# Patient Record
Sex: FEMALE | URBAN - METROPOLITAN AREA
[De-identification: names, ages, dates, MRNs, and addresses within clinical notes are randomized per-mention and may not be internally consistent; named-entity substitution may affect disease eponyms.]

---

## 2017-10-09 ENCOUNTER — INPATIENT (INPATIENT)
Facility: HOSPITAL | Age: 77
LOS: 1 days | Discharge: HOME | End: 2017-10-11
Attending: INTERNAL MEDICINE | Admitting: INTERNAL MEDICINE
Payer: COMMERCIAL

## 2017-10-09 VITALS
RESPIRATION RATE: 17 BRPM | HEART RATE: 69 BPM | WEIGHT: 119.93 LBS | OXYGEN SATURATION: 97 % | HEIGHT: 61 IN | DIASTOLIC BLOOD PRESSURE: 73 MMHG | TEMPERATURE: 98 F | SYSTOLIC BLOOD PRESSURE: 145 MMHG

## 2017-10-09 LAB
ALBUMIN SERPL ELPH-MCNC: 3.3 G/DL — SIGNIFICANT CHANGE UP (ref 3.3–5)
ALP SERPL-CCNC: 85 U/L — SIGNIFICANT CHANGE UP (ref 40–120)
ALT FLD-CCNC: 26 U/L — SIGNIFICANT CHANGE UP (ref 12–78)
ANION GAP SERPL CALC-SCNC: 11 MMOL/L — SIGNIFICANT CHANGE UP (ref 5–17)
APTT BLD: 25.3 SEC — LOW (ref 27.5–37.4)
AST SERPL-CCNC: 26 U/L — SIGNIFICANT CHANGE UP (ref 15–37)
BASOPHILS # BLD AUTO: 0.1 K/UL — SIGNIFICANT CHANGE UP (ref 0–0.2)
BASOPHILS NFR BLD AUTO: 0.4 % — SIGNIFICANT CHANGE UP (ref 0–2)
BILIRUB SERPL-MCNC: 0.5 MG/DL — SIGNIFICANT CHANGE UP (ref 0.2–1.2)
BUN SERPL-MCNC: 15 MG/DL — SIGNIFICANT CHANGE UP (ref 7–23)
CALCIUM SERPL-MCNC: 7.8 MG/DL — LOW (ref 8.5–10.1)
CHLORIDE SERPL-SCNC: 100 MMOL/L — SIGNIFICANT CHANGE UP (ref 96–108)
CK MB BLD-MCNC: 1.3 % — SIGNIFICANT CHANGE UP (ref 0–3.5)
CK MB CFR SERPL CALC: 1.8 NG/ML — SIGNIFICANT CHANGE UP (ref 0.5–3.6)
CK SERPL-CCNC: 136 U/L — SIGNIFICANT CHANGE UP (ref 26–192)
CO2 SERPL-SCNC: 23 MMOL/L — SIGNIFICANT CHANGE UP (ref 22–31)
CREAT SERPL-MCNC: 0.82 MG/DL — SIGNIFICANT CHANGE UP (ref 0.5–1.3)
EOSINOPHIL # BLD AUTO: 0.1 K/UL — SIGNIFICANT CHANGE UP (ref 0–0.5)
EOSINOPHIL NFR BLD AUTO: 0.6 % — SIGNIFICANT CHANGE UP (ref 0–6)
GLUCOSE SERPL-MCNC: 115 MG/DL — HIGH (ref 70–99)
HCT VFR BLD CALC: 37.6 % — SIGNIFICANT CHANGE UP (ref 34.5–45)
HGB BLD-MCNC: 12.6 G/DL — SIGNIFICANT CHANGE UP (ref 11.5–15.5)
INR BLD: 0.95 RATIO — SIGNIFICANT CHANGE UP (ref 0.88–1.16)
LYMPHOCYTES # BLD AUTO: 1.4 K/UL — SIGNIFICANT CHANGE UP (ref 1–3.3)
LYMPHOCYTES # BLD AUTO: 8.7 % — LOW (ref 13–44)
MCHC RBC-ENTMCNC: 30.6 PG — SIGNIFICANT CHANGE UP (ref 27–34)
MCHC RBC-ENTMCNC: 33.6 GM/DL — SIGNIFICANT CHANGE UP (ref 32–36)
MCV RBC AUTO: 91.1 FL — SIGNIFICANT CHANGE UP (ref 80–100)
MONOCYTES # BLD AUTO: 1 K/UL — HIGH (ref 0–0.9)
MONOCYTES NFR BLD AUTO: 5.9 % — SIGNIFICANT CHANGE UP (ref 2–14)
NEUTROPHILS # BLD AUTO: 14.1 K/UL — HIGH (ref 1.8–7.4)
NEUTROPHILS NFR BLD AUTO: 84.4 % — HIGH (ref 43–77)
PLATELET # BLD AUTO: 228 K/UL — SIGNIFICANT CHANGE UP (ref 150–400)
POTASSIUM SERPL-MCNC: 4.1 MMOL/L — SIGNIFICANT CHANGE UP (ref 3.5–5.3)
POTASSIUM SERPL-SCNC: 4.1 MMOL/L — SIGNIFICANT CHANGE UP (ref 3.5–5.3)
PROT SERPL-MCNC: 6.8 GM/DL — SIGNIFICANT CHANGE UP (ref 6–8.3)
PROTHROM AB SERPL-ACNC: 10.3 SEC — SIGNIFICANT CHANGE UP (ref 9.8–12.7)
RBC # BLD: 4.12 M/UL — SIGNIFICANT CHANGE UP (ref 3.8–5.2)
RBC # FLD: 12.1 % — SIGNIFICANT CHANGE UP (ref 11–15)
SODIUM SERPL-SCNC: 134 MMOL/L — LOW (ref 135–145)
TROPONIN I SERPL-MCNC: <.015 NG/ML — SIGNIFICANT CHANGE UP (ref 0.01–0.04)
WBC # BLD: 16.7 K/UL — HIGH (ref 3.8–10.5)
WBC # FLD AUTO: 16.7 K/UL — HIGH (ref 3.8–10.5)

## 2017-10-09 PROCEDURE — 71010: CPT | Mod: 26

## 2017-10-09 PROCEDURE — 99223 1ST HOSP IP/OBS HIGH 75: CPT

## 2017-10-09 PROCEDURE — 70450 CT HEAD/BRAIN W/O DYE: CPT | Mod: 26

## 2017-10-09 PROCEDURE — 99285 EMERGENCY DEPT VISIT HI MDM: CPT

## 2017-10-09 RX ORDER — SODIUM CHLORIDE 9 MG/ML
3 INJECTION INTRAMUSCULAR; INTRAVENOUS; SUBCUTANEOUS ONCE
Qty: 0 | Refills: 0 | Status: COMPLETED | OUTPATIENT
Start: 2017-10-09 | End: 2017-10-09

## 2017-10-09 RX ORDER — ONDANSETRON 8 MG/1
0 TABLET, FILM COATED ORAL
Qty: 0 | Refills: 0 | COMMUNITY

## 2017-10-09 RX ORDER — AMLODIPINE BESYLATE 2.5 MG/1
1 TABLET ORAL
Qty: 0 | Refills: 0 | COMMUNITY

## 2017-10-09 RX ORDER — SIMVASTATIN 20 MG/1
1 TABLET, FILM COATED ORAL
Qty: 0 | Refills: 0 | COMMUNITY

## 2017-10-09 RX ORDER — FAMOTIDINE 10 MG/ML
20 INJECTION INTRAVENOUS ONCE
Qty: 0 | Refills: 0 | Status: COMPLETED | OUTPATIENT
Start: 2017-10-09 | End: 2017-10-09

## 2017-10-09 RX ORDER — SODIUM CHLORIDE 9 MG/ML
500 INJECTION INTRAMUSCULAR; INTRAVENOUS; SUBCUTANEOUS ONCE
Qty: 0 | Refills: 0 | Status: COMPLETED | OUTPATIENT
Start: 2017-10-09 | End: 2017-10-09

## 2017-10-09 RX ADMIN — Medication 30 MILLILITER(S): at 19:59

## 2017-10-09 RX ADMIN — SODIUM CHLORIDE 500 MILLILITER(S): 9 INJECTION INTRAMUSCULAR; INTRAVENOUS; SUBCUTANEOUS at 20:08

## 2017-10-09 RX ADMIN — SODIUM CHLORIDE 3 MILLILITER(S): 9 INJECTION INTRAMUSCULAR; INTRAVENOUS; SUBCUTANEOUS at 20:09

## 2017-10-09 RX ADMIN — FAMOTIDINE 20 MILLIGRAM(S): 10 INJECTION INTRAVENOUS at 20:08

## 2017-10-09 NOTE — ED PROVIDER NOTE - MEDICAL DECISION MAKING DETAILS
Pt well appearing and asymptomatic. wcc likely from stress. pending UA. CT scan demonstrates no acute pathology. EKG wnl. CE neg. d/w dr. reaves for admission.

## 2017-10-09 NOTE — ED PROVIDER NOTE - OBJECTIVE STATEMENT
75yo female with pmh HTN, HL, GERD, recent dx of invasive breast CA, s/p rt lumpectomy today, dc at 430. Pt has been fasting since yesterday. Pt felt lightheaded and a little unstable gait since dc. Pt felt thirsty and has some food pta and felt rt breast pain and took 600mg of motrin while sitting. Then family noted pt was diaphoretic, slumped backwards and not responsive with slurred speech. Pt was confused for ~ 10 min until arrival by EMS.  When pt awoke, vomit x 1. Pt now c/o of SS burning pain similar to her GERD after she vomited and denies dizziness.     ROS: No fever/chills. No photophobia/eye pain/changes in vision, No ear pain/sore throat/dysphagia, No chest pain/palpitations. No SOB/cough/stridor. No abdominal pain, N/V/D, no black/bloody bm. No dysuria/frequency/discharge, No headache. + Dizziness.  No rash.  No numbness/tingling/weakness.

## 2017-10-09 NOTE — ED ADULT NURSE NOTE - OBJECTIVE STATEMENT
pt brought in by family with c/o pt brought in by family with c/o episode of unresponsive for 10 minutes , became responsive when the EMS arrived, pt is now axox4 Mongolian speaking with little English, not in any distress, pt is s/p lumpectomy done today with pain at the site, vss, will monitor.

## 2017-10-09 NOTE — ED PROVIDER NOTE - PHYSICAL EXAMINATION
Gen: Alert, Well appearing. NAD    Head: NC, AT, PERRL, EOMI, normal lids/conjunctiva   ENT: Bilateral TM WNL, normal hearing, patent oropharynx without erythema/exudate, uvula midline  Neck: supple, no tenderness/meningismus/JVD   Pulm: Bilateral clear BS, normal resp effort, no wheeze/stridor/retractions  CV: RRR, no M/R/G, +dist pulses   Abd: soft, NT/ND, +BS, no guarding/rebound tenderness  Mskel: no edema/erythema/cyanosis   Skin: rt breast lumpectomy: no significant erythema, swelling, well healed  Neuro: AAOx3, no sensory/motor deficits, CN 2-12 intact

## 2017-10-09 NOTE — ED ADULT TRIAGE NOTE - CHIEF COMPLAINT QUOTE
episode of unresponsiveness and diaphoretic/ pale   patient had lumpectomy to right breast this am took Motrin went to eat / vomited and then had unresponsiveness  patient alert in triage

## 2017-10-10 DIAGNOSIS — K21.9 GASTRO-ESOPHAGEAL REFLUX DISEASE WITHOUT ESOPHAGITIS: ICD-10-CM

## 2017-10-10 DIAGNOSIS — R55 SYNCOPE AND COLLAPSE: ICD-10-CM

## 2017-10-10 DIAGNOSIS — I10 ESSENTIAL (PRIMARY) HYPERTENSION: ICD-10-CM

## 2017-10-10 LAB
ANION GAP SERPL CALC-SCNC: 9 MMOL/L — SIGNIFICANT CHANGE UP (ref 5–17)
APPEARANCE UR: CLEAR — SIGNIFICANT CHANGE UP
BASOPHILS # BLD AUTO: 0.1 K/UL — SIGNIFICANT CHANGE UP (ref 0–0.2)
BASOPHILS NFR BLD AUTO: 0.4 % — SIGNIFICANT CHANGE UP (ref 0–2)
BILIRUB UR-MCNC: NEGATIVE — SIGNIFICANT CHANGE UP
BUN SERPL-MCNC: 13 MG/DL — SIGNIFICANT CHANGE UP (ref 7–23)
CALCIUM SERPL-MCNC: 8 MG/DL — LOW (ref 8.5–10.1)
CHLORIDE SERPL-SCNC: 111 MMOL/L — HIGH (ref 96–108)
CHOLEST SERPL-MCNC: 183 MG/DL — SIGNIFICANT CHANGE UP (ref 10–199)
CO2 SERPL-SCNC: 23 MMOL/L — SIGNIFICANT CHANGE UP (ref 22–31)
COLOR SPEC: YELLOW — SIGNIFICANT CHANGE UP
COMMENT - URINE: SIGNIFICANT CHANGE UP
CREAT SERPL-MCNC: 0.85 MG/DL — SIGNIFICANT CHANGE UP (ref 0.5–1.3)
DIFF PNL FLD: NEGATIVE — SIGNIFICANT CHANGE UP
EOSINOPHIL # BLD AUTO: 0.1 K/UL — SIGNIFICANT CHANGE UP (ref 0–0.5)
EOSINOPHIL NFR BLD AUTO: 0.8 % — SIGNIFICANT CHANGE UP (ref 0–6)
GLUCOSE SERPL-MCNC: 87 MG/DL — SIGNIFICANT CHANGE UP (ref 70–99)
GLUCOSE UR QL: NEGATIVE MG/DL — SIGNIFICANT CHANGE UP
HBA1C BLD-MCNC: 5.5 % — SIGNIFICANT CHANGE UP (ref 4–5.6)
HCT VFR BLD CALC: 39.2 % — SIGNIFICANT CHANGE UP (ref 34.5–45)
HDLC SERPL-MCNC: 57 MG/DL — SIGNIFICANT CHANGE UP (ref 40–125)
HGB BLD-MCNC: 12.8 G/DL — SIGNIFICANT CHANGE UP (ref 11.5–15.5)
KETONES UR-MCNC: NEGATIVE — SIGNIFICANT CHANGE UP
LEUKOCYTE ESTERASE UR-ACNC: ABNORMAL
LIPID PNL WITH DIRECT LDL SERPL: 97 MG/DL — SIGNIFICANT CHANGE UP
LYMPHOCYTES # BLD AUTO: 1.3 K/UL — SIGNIFICANT CHANGE UP (ref 1–3.3)
LYMPHOCYTES # BLD AUTO: 9.9 % — LOW (ref 13–44)
MCHC RBC-ENTMCNC: 30.4 PG — SIGNIFICANT CHANGE UP (ref 27–34)
MCHC RBC-ENTMCNC: 32.6 GM/DL — SIGNIFICANT CHANGE UP (ref 32–36)
MCV RBC AUTO: 93.2 FL — SIGNIFICANT CHANGE UP (ref 80–100)
MONOCYTES # BLD AUTO: 0.7 K/UL — SIGNIFICANT CHANGE UP (ref 0–0.9)
MONOCYTES NFR BLD AUTO: 4.9 % — SIGNIFICANT CHANGE UP (ref 2–14)
NEUTROPHILS # BLD AUTO: 11.4 K/UL — HIGH (ref 1.8–7.4)
NEUTROPHILS NFR BLD AUTO: 84 % — HIGH (ref 43–77)
NITRITE UR-MCNC: NEGATIVE — SIGNIFICANT CHANGE UP
PH UR: 7 — SIGNIFICANT CHANGE UP (ref 5–8)
PLATELET # BLD AUTO: 209 K/UL — SIGNIFICANT CHANGE UP (ref 150–400)
POTASSIUM SERPL-MCNC: 4.1 MMOL/L — SIGNIFICANT CHANGE UP (ref 3.5–5.3)
POTASSIUM SERPL-SCNC: 4.1 MMOL/L — SIGNIFICANT CHANGE UP (ref 3.5–5.3)
PROT UR-MCNC: NEGATIVE MG/DL — SIGNIFICANT CHANGE UP
RBC # BLD: 4.2 M/UL — SIGNIFICANT CHANGE UP (ref 3.8–5.2)
RBC # FLD: 11.8 % — SIGNIFICANT CHANGE UP (ref 11–15)
SODIUM SERPL-SCNC: 143 MMOL/L — SIGNIFICANT CHANGE UP (ref 135–145)
SP GR SPEC: 1 — LOW (ref 1.01–1.02)
TOTAL CHOLESTEROL/HDL RATIO MEASUREMENT: 3.2 RATIO — LOW (ref 3.3–7.1)
TRIGL SERPL-MCNC: 145 MG/DL — SIGNIFICANT CHANGE UP (ref 10–149)
TROPONIN I SERPL-MCNC: <.015 NG/ML — SIGNIFICANT CHANGE UP (ref 0.01–0.04)
UROBILINOGEN FLD QL: NEGATIVE MG/DL — SIGNIFICANT CHANGE UP
WBC # BLD: 13.6 K/UL — HIGH (ref 3.8–10.5)
WBC # FLD AUTO: 13.6 K/UL — HIGH (ref 3.8–10.5)
WBC UR QL: SIGNIFICANT CHANGE UP

## 2017-10-10 PROCEDURE — 93010 ELECTROCARDIOGRAM REPORT: CPT

## 2017-10-10 PROCEDURE — 99232 SBSQ HOSP IP/OBS MODERATE 35: CPT

## 2017-10-10 PROCEDURE — 99223 1ST HOSP IP/OBS HIGH 75: CPT

## 2017-10-10 RX ORDER — ASPIRIN/CALCIUM CARB/MAGNESIUM 324 MG
81 TABLET ORAL DAILY
Qty: 0 | Refills: 0 | Status: DISCONTINUED | OUTPATIENT
Start: 2017-10-10 | End: 2017-10-11

## 2017-10-10 RX ORDER — SIMVASTATIN 20 MG/1
20 TABLET, FILM COATED ORAL AT BEDTIME
Qty: 0 | Refills: 0 | Status: DISCONTINUED | OUTPATIENT
Start: 2017-10-10 | End: 2017-10-11

## 2017-10-10 RX ORDER — PANTOPRAZOLE SODIUM 20 MG/1
40 TABLET, DELAYED RELEASE ORAL DAILY
Qty: 0 | Refills: 0 | Status: DISCONTINUED | OUTPATIENT
Start: 2017-10-10 | End: 2017-10-11

## 2017-10-10 RX ORDER — AMLODIPINE BESYLATE 2.5 MG/1
2.5 TABLET ORAL DAILY
Qty: 0 | Refills: 0 | Status: DISCONTINUED | OUTPATIENT
Start: 2017-10-10 | End: 2017-10-11

## 2017-10-10 RX ORDER — SODIUM CHLORIDE 9 MG/ML
1000 INJECTION INTRAMUSCULAR; INTRAVENOUS; SUBCUTANEOUS ONCE
Qty: 0 | Refills: 0 | Status: COMPLETED | OUTPATIENT
Start: 2017-10-10 | End: 2017-10-10

## 2017-10-10 RX ORDER — ONDANSETRON 8 MG/1
4 TABLET, FILM COATED ORAL EVERY 6 HOURS
Qty: 0 | Refills: 0 | Status: DISCONTINUED | OUTPATIENT
Start: 2017-10-10 | End: 2017-10-11

## 2017-10-10 RX ORDER — HEPARIN SODIUM 5000 [USP'U]/ML
5000 INJECTION INTRAVENOUS; SUBCUTANEOUS EVERY 12 HOURS
Qty: 0 | Refills: 0 | Status: DISCONTINUED | OUTPATIENT
Start: 2017-10-10 | End: 2017-10-11

## 2017-10-10 RX ORDER — ACETAMINOPHEN 500 MG
650 TABLET ORAL EVERY 6 HOURS
Qty: 0 | Refills: 0 | Status: DISCONTINUED | OUTPATIENT
Start: 2017-10-10 | End: 2017-10-11

## 2017-10-10 RX ORDER — CIPROFLOXACIN LACTATE 400MG/40ML
250 VIAL (ML) INTRAVENOUS
Qty: 0 | Refills: 0 | Status: DISCONTINUED | OUTPATIENT
Start: 2017-10-10 | End: 2017-10-11

## 2017-10-10 RX ADMIN — HEPARIN SODIUM 5000 UNIT(S): 5000 INJECTION INTRAVENOUS; SUBCUTANEOUS at 17:12

## 2017-10-10 RX ADMIN — HEPARIN SODIUM 5000 UNIT(S): 5000 INJECTION INTRAVENOUS; SUBCUTANEOUS at 06:05

## 2017-10-10 RX ADMIN — PANTOPRAZOLE SODIUM 40 MILLIGRAM(S): 20 TABLET, DELAYED RELEASE ORAL at 11:41

## 2017-10-10 RX ADMIN — Medication 81 MILLIGRAM(S): at 11:42

## 2017-10-10 RX ADMIN — SIMVASTATIN 20 MILLIGRAM(S): 20 TABLET, FILM COATED ORAL at 21:26

## 2017-10-10 RX ADMIN — SODIUM CHLORIDE 1500 MILLILITER(S): 9 INJECTION INTRAMUSCULAR; INTRAVENOUS; SUBCUTANEOUS at 03:12

## 2017-10-10 RX ADMIN — Medication 650 MILLIGRAM(S): at 04:20

## 2017-10-10 RX ADMIN — Medication 650 MILLIGRAM(S): at 03:49

## 2017-10-10 RX ADMIN — AMLODIPINE BESYLATE 2.5 MILLIGRAM(S): 2.5 TABLET ORAL at 06:05

## 2017-10-10 RX ADMIN — Medication 250 MILLIGRAM(S): at 17:12

## 2017-10-10 NOTE — H&P ADULT - PMH
Essential hypertension    Gastroesophageal reflux disease, esophagitis presence not specified    Syncope, unspecified syncope type

## 2017-10-10 NOTE — CONSULT NOTE ADULT - SUBJECTIVE AND OBJECTIVE BOX
CARDIOLOGY CONSULT NOTE    Patient is a 76y Female with a known history of :  Gastroesophageal reflux disease, esophagitis presence not specified (K21.9)  Essential hypertension (I10)  Syncope, unspecified syncope type (R55)    HPI:  75 y/o female with pmh HTN, HL, GERD, recent dx of invasive breast CA, s/p rt lumpectomy yesterday. Pt had been fasting for the procedure and upon discharge, felt lightheaded and a little unstable gait.  At home, she felt thirsty and had some food; also c/p rt breast pain at the incision site. As per family, she subsequently became very profusely diaphoretic, slumped backwards and became responsive with slurred speech. Pt was confused for ~ 10 min.  When pt awoke, vomited x 1. When seen by ED physician, c/o SS burning pain similar to her GERD after she vomited and denied dizziness. Pt treated with antacid and H2 blocker and symptoms resolved. Pt has history of previous syncopal episodes, per son, this was never worked up. No FH syncope. No fever/chills. No photophobia/eye pain/changes in vision, No ear pain/sore throat/dysphagia, No current chest pain/palpitations. No SOB/cough/stridor. No abdominal pain, N/V/D, no black/bloody bm. No dysuria/frequency/discharge, No headache. + Dizziness.  No rash.  No numbness/tingling/weakness.  Overnight, sx resolved.  Not orthostatic this AM and was able to walk a little.  EKG unremarkable.  CE neg x 2.      REVIEW OF SYSTEMS (today):    CONSTITUTIONAL: No fever, weight loss, or fatigue  EYES: No eye pain, visual disturbances, or discharge  ENMT:  No difficulty hearing, tinnitus, vertigo; No sinus or throat pain  NECK: No pain or stiffness  BREASTS: No pain, masses, or nipple discharge  RESPIRATORY: No cough, wheezing, chills or hemoptysis; No shortness of breath  CARDIOVASCULAR: No chest pain, palpitations, dizziness, or leg swelling  GASTROINTESTINAL: No abdominal or epigastric pain. No nausea, vomiting, or hematemesis; No diarrhea or constipation. No melena or hematochezia.  GENITOURINARY: No dysuria, frequency, hematuria, or incontinence  NEUROLOGICAL: No headaches, memory loss, loss of strength, numbness, or tremors  SKIN: No itching, burning, rashes, or lesions   LYMPH NODES: No enlarged glands  ENDOCRINE: No heat or cold intolerance; No hair loss  MUSCULOSKELETAL: No joint pain or swelling; No muscle, back, or extremity pain  PSYCHIATRIC: No depression, anxiety, mood swings, or difficulty sleeping  HEME/LYMPH: No easy bruising, or bleeding gums  ALLERGY AND IMMUNOLOGIC: No hives or eczema    MEDICATIONS  (STANDING):  amLODIPine   Tablet 2.5 milliGRAM(s) Oral daily  aspirin enteric coated 81 milliGRAM(s) Oral daily  heparin  Injectable 5000 Unit(s) SubCutaneous every 12 hours  pantoprazole  Injectable 40 milliGRAM(s) IV Push daily  simvastatin 20 milliGRAM(s) Oral at bedtime    MEDICATIONS  (PRN):  acetaminophen   Tablet. 650 milliGRAM(s) Oral every 6 hours PRN Mild Pain (1 - 3)  ondansetron Injectable 4 milliGRAM(s) IV Push every 6 hours PRN Nausea and/or Vomiting      ALLERGIES: penicillin (Rash)      FAMILY HISTORY:  Family history of early CAD (Sibling)      PHYSICAL EXAMINATION:  -----------------------------  T(C): 36.6 (10-10-17 @ 05:19), Max: 36.6 (10-09-17 @ 18:56)  HR: 70 (10-10-17 @ 05:19) (68 - 70)  BP: 130/66 (10-10-17 @ 05:19) (130/66 - 160/71)  RR: 18 (10-10-17 @ 05:19) (17 - 20)  SpO2: 98% (10-10-17 @ 05:19) (97% - 99%)  Wt(kg): --    Height (cm): 154.94 (10-09 @ 18:56)  Weight (kg): 54.4 (10-09 @ 18:56)  BMI (kg/m2): 22.7 (10-09 @ 18:56)  BSA (m2): 1.52 (10-09 @ 18:56)    Constitutional: well developed, normal appearance, well groomed, well nourished, no deformities and no acute distress.   Eyes: the conjunctiva exhibited no abnormalities and the eyelids demonstrated no xanthelasmas.   HEENT: normal oral mucosa, no oral pallor and no oral cyanosis.   Neck: normal jugular venous A waves present, normal jugular venous V waves present and no jugular venous jack A waves.   Pulmonary: no respiratory distress, normal respiratory rhythm and effort, no accessory muscle use and lungs were clear to auscultation bilaterally.   Cardiovascular: heart rate and rhythm were normal, normal S1 and S2 and no murmur, gallop, rub, heave or thrill are present.   Abdomen: soft, non-tender, no hepato-splenomegaly and no abdominal mass palpated.   Musculoskeletal: the gait could not be assessed..   Extremities: no clubbing of the fingernails, no localized cyanosis, no petechial hemorrhages and no ischemic changes.   Skin: normal skin color and pigmentation, no rash, no venous stasis, no skin lesions, no skin ulcer and no xanthoma was observed.   Psychiatric: oriented to person, place, and time, the affect was normal, the mood was normal and not feeling anxious.     LABS:   --------  10-10    143  |  111<H>  |  13  ----------------------------<  87  4.1   |  23  |  0.85    Ca    8.0<L>      10 Oct 2017 08:22    TPro  6.8  /  Alb  3.3  /  TBili  0.5  /  DBili  x   /  AST  26  /  ALT  26  /  AlkPhos  85  10-09                         12.8   13.6  )-----------( 209      ( 10 Oct 2017 08:22 )             39.2     PT/INR - ( 09 Oct 2017 20:16 )   PT: 10.3 sec;   INR: 0.95 ratio         PTT - ( 09 Oct 2017 20:16 )  PTT:25.3 sec    10-10 @ 08:22 CPK total:--, CKMB --, Troponin I - <.015 ng/mL  10-09 @ 20:16 CPK total:--, CKMB --, Troponin I - <.015 ng/mL    183 mg/dL, 97 mg/dL, 57 mg/dL, 145 mg/dL        RADIOLOGY:  -----------------    ECG: NSR 70bpm

## 2017-10-10 NOTE — PHYSICAL THERAPY INITIAL EVALUATION ADULT - ADDITIONAL COMMENTS
Pt does not have/use assistive devices, ambulates community distances, drives, with 6 steps to enter home.

## 2017-10-10 NOTE — PHYSICAL THERAPY INITIAL EVALUATION ADULT - GENERAL OBSERVATIONS, REHAB EVAL
Pt encountered ambulating independently to bathroom, cardiac monitor in place, alert and oriented x4, NAD.

## 2017-10-10 NOTE — CONSULT NOTE ADULT - ASSESSMENT
77yo lady admitted with likely vasovagal syncope.  Pt was NPO all day for surgery, and subsequently complained of incisional pain prior to syncope.  EKG completely normal.  CE neg x 2.  Completely asymptomatic today after IVFs/PO/pain control.  -monitor on tele  -2D echo; if echo normal, ok to d/c home for further outpt ischemic work-up  -neuro eval pending  -cont current home for HTN  -cont statin for HL; tolerating it well

## 2017-10-10 NOTE — H&P ADULT - HISTORY OF PRESENT ILLNESS
75 y/o female with pmh HTN, HL, GERD, recent dx of invasive breast CA, s/p rt lumpectomy today, d/c at 430. Pt has been fasting since yesterday. Pt felt lightheaded and a little unstable gait since discharge. Pt felt thirsty and had some food, also felt rt breast pain and took 600mg of motrin while sitting. Then family noted pt was diaphoretic, slumped backwards and not responsive with slurred speech. Pt was confused for ~ 10 min until arrival by EMS.  When pt awoke, vomited x 1. When seen by ED physician, c/o SS burning pain similar to her GERD after she vomited and denied dizziness. Pt treated with antacid and H2 blocker and symptoms resolved. Pt has history of previous syncopal episodes, per son, this was never worked up. No FH syncope. No fever/chills. No photophobia/eye pain/changes in vision, No ear pain/sore throat/dysphagia, No current chest pain/palpitations. No SOB/cough/stridor. No abdominal pain, N/V/D, no black/bloody bm. No dysuria/frequency/discharge, No headache. + Dizziness.  No rash.  No numbness/tingling/weakness.

## 2017-10-10 NOTE — H&P ADULT - NSHPLABSRESULTS_GEN_ALL_CORE
LABS:                        12.6   16.7  )-----------( 228      ( 09 Oct 2017 20:16 )             37.6     10-09    134<L>  |  100  |  15  ----------------------------<  115<H>  4.1   |  23  |  0.82    Ca    7.8<L>      09 Oct 2017 20:16    TPro  6.8  /  Alb  3.3  /  TBili  0.5  /  DBili  x   /  AST  26  /  ALT  26  /  AlkPhos  85  10-09    PT/INR - ( 09 Oct 2017 20:16 )   PT: 10.3 sec;   INR: 0.95 ratio         PTT - ( 09 Oct 2017 20:16 )  PTT:25.3 sec  Urinalysis Basic - ( 10 Oct 2017 01:00 )  Troponin I, Serum: <.015:  ng/mL (10.09.17 @ 20:16)    Color: Yellow / Appearance: Clear / S.005 / pH: x  Gluc: x / Ketone: Negative  / Bili: Negative / Urobili: Negative mg/dL   Blood: x / Protein: Negative mg/dL / Nitrite: Negative   Leuk Esterase: Trace / RBC: x / WBC 3-5   Sq Epi: x / Non Sq Epi: x / Bacteria: x      CAPILLARY BLOOD GLUCOSE          RADIOLOGY & ADDITIONAL TESTS:   CT Head No Cont (10.09.17 @ 21:18) >  No acute intracranial hemorrhage, large cortical infarct or mass effect.     CXR: no infiltrate      Imaging Personally Reviewed:  [ x] YES  [ ] NO  EKG: sinus@70, no acute ST T changes

## 2017-10-10 NOTE — H&P ADULT - ASSESSMENT
77 y/o woman with HTN GERD presents with syncopal episode; she had had a biopsy done earlier in the day and took a pain medication prior to syncopal episode, she had burning chest pain afterward relieved with antacid. No further pain. She had had syncopal episodes previously with no workup according to son. Troponin not elevated ane there are no EKG changes.  Will admit for syncope.

## 2017-10-10 NOTE — H&P ADULT - NSHPPHYSICALEXAM_GEN_ALL_CORE
T(C): 36.3 (09 Oct 2017 20:42), Max: 36.6 (09 Oct 2017 18:56)  T(F): 97.4 (09 Oct 2017 20:42), Max: 97.8 (09 Oct 2017 18:56)  HR: 70 (09 Oct 2017 20:42) (69 - 70)  BP: 147/68 (09 Oct 2017 20:42) (145/73 - 160/71)  BP(mean): --  RR: 19 (09 Oct 2017 20:42) (17 - 20)  SpO2: 97% (09 Oct 2017 20:42) (97% - 98%)    PHYSICAL EXAM:  GENERAL: NAD, well-groomed, well-developed  HEAD:  Atraumatic, Normocephalic  EYES: EOMI, PERRLA, conjunctiva and sclera clear  ENMT: No tonsillar erythema, exudates, or enlargement; Moist mucous membranes, Good dentition, No lesions  NECK: Supple, No JVD, Normal thyroid  NERVOUS SYSTEM:  Alert & Oriented X3, CN 2-12 intact; Motor Strength 5/5 B/L upper and lower extremities; DTRs 2+ intact and symmetric  CHEST/LUNG: Clear to percussion bilaterally; No rales, rhonchi, wheezing, or rubs, clean surgical incision right breast  HEART: Regular rate and rhythm; No murmurs, rubs, or gallops  ABDOMEN: Soft, Nontender, Nondistended; Bowel sounds present  EXTREMITIES:  2+ Peripheral Pulses, No clubbing, cyanosis, or edema  LYMPH: No lymphadenopathy noted  SKIN: No rashes or lesions

## 2017-10-10 NOTE — CHART NOTE - NSCHARTNOTEFT_GEN_A_CORE
75 y/o female with pmh HTN, HL, GERD, recent dx of invasive breast CA, s/p rt lumpectomy yesterday presented with syncope. The patient fasted for her procedure; after it was done she felt lightheaded. She became diaphoretic and slumped over in front of her family. Pt was seen and examined at bedside.  was used for [place]. Pt denies any CP, SOB, palpitations and dizziness at this time. The patient admitted for syncope workup. Head CT, CXR, EKG and telemetry failed to show acute changes, and trop has been negative. Cardiology saw the patient and is waiting Echo to be done. Pt was found to have leukocytosis and a mildly positive UA. She was started on antibiotics.

## 2017-10-10 NOTE — PHYSICAL THERAPY INITIAL EVALUATION ADULT - DISCHARGE DISPOSITION, PT EVAL
pt is independent, no skilled PT intervention indicated at this time, discharged from PT program./home

## 2017-10-11 ENCOUNTER — TRANSCRIPTION ENCOUNTER (OUTPATIENT)
Age: 77
End: 2017-10-11

## 2017-10-11 VITALS
SYSTOLIC BLOOD PRESSURE: 137 MMHG | HEART RATE: 74 BPM | DIASTOLIC BLOOD PRESSURE: 64 MMHG | RESPIRATION RATE: 19 BRPM | OXYGEN SATURATION: 98 % | TEMPERATURE: 98 F

## 2017-10-11 PROCEDURE — 99232 SBSQ HOSP IP/OBS MODERATE 35: CPT

## 2017-10-11 PROCEDURE — 99239 HOSP IP/OBS DSCHRG MGMT >30: CPT

## 2017-10-11 RX ORDER — CIPROFLOXACIN LACTATE 400MG/40ML
1 VIAL (ML) INTRAVENOUS
Qty: 10 | Refills: 0 | OUTPATIENT
Start: 2017-10-11 | End: 2017-10-16

## 2017-10-11 RX ORDER — ASPIRIN/CALCIUM CARB/MAGNESIUM 324 MG
1 TABLET ORAL
Qty: 30 | Refills: 0 | OUTPATIENT
Start: 2017-10-11 | End: 2017-11-10

## 2017-10-11 RX ORDER — IBUPROFEN 200 MG
1 TABLET ORAL
Qty: 0 | Refills: 0 | COMMUNITY

## 2017-10-11 RX ORDER — OMEPRAZOLE 10 MG/1
1 CAPSULE, DELAYED RELEASE ORAL
Qty: 0 | Refills: 0 | COMMUNITY

## 2017-10-11 RX ADMIN — AMLODIPINE BESYLATE 2.5 MILLIGRAM(S): 2.5 TABLET ORAL at 05:47

## 2017-10-11 RX ADMIN — HEPARIN SODIUM 5000 UNIT(S): 5000 INJECTION INTRAVENOUS; SUBCUTANEOUS at 05:48

## 2017-10-11 RX ADMIN — Medication 250 MILLIGRAM(S): at 05:48

## 2017-10-11 RX ADMIN — Medication 81 MILLIGRAM(S): at 11:36

## 2017-10-11 RX ADMIN — PANTOPRAZOLE SODIUM 40 MILLIGRAM(S): 20 TABLET, DELAYED RELEASE ORAL at 11:37

## 2017-10-11 NOTE — DISCHARGE NOTE ADULT - MEDICATION SUMMARY - MEDICATIONS TO TAKE
I will START or STAY ON the medications listed below when I get home from the hospital:    aspirin 81 mg oral delayed release tablet  -- 1 tab(s) by mouth once a day  -- Indication: For Cardio-protective     ondansetron 4 mg oral tablet, disintegrating  -- orally every 4 hours  -- Indication: For Nausea/vomiting     simvastatin 10 mg oral tablet  -- 1 tab(s) by mouth once a day (at bedtime)  -- Indication: For cholesterol, cardio-protective     amLODIPine 10 mg oral tablet  -- 1 tab(s) by mouth once a day  -- Indication: For Essential hypertension    ciprofloxacin 250 mg oral tablet  -- 1 tab(s) by mouth 2 times a day  -- Indication: For Urinary track infection

## 2017-10-11 NOTE — DISCHARGE NOTE ADULT - HOSPITAL COURSE
75 y/o female with past medical history of HTN, HLD, GERD, recent dx of invasive breast CA, s/p rt lumpectomy was admitted after she became unresponsive followed by slurred speech. The day of admission the patient was just discharged after getting a right lumpectomy; she fasted for this procedure so she had not eaten that day. She admitted to feeling lightheaded with an unstable gait that day. She also vomited and compliant of epigastric burning. In the ER a Head CT was done and showed no acute changes. Chest X-ray and EKG did not show any acute changes. The patient was admitted to Telemetry bed. During her hospital course there were no events on the telemetry monitor. Cardiac enzymes were done and were negative X3. Cardiology followed the patient during inpatient course; Echo was recommended. Echocardiogram was done and was benign. Cardiology final impression is vasovagal syncope; the patient can be discharged and follow up with Cardiology in 1-2 weeks.     On initial workup the patient had leukocytosis. Urinalysis was done and was significant for mild Leuk esterase. The patient was started on PO Ciprofloxacin. Repeat CBC showed improvement of Leukocytosis and the patient remained asymptomatic. Urine culture was positive for E. Coli; sensitivities still pending. Will continue with Ciprofloxacin will be continued as outpatient. The patient is recommended to follow up with her PCP in 1-2 weeks.

## 2017-10-11 NOTE — DISCHARGE NOTE ADULT - CARE PLAN
Principal Discharge DX:	Vasovagal syncope  Goal:	No dizziness or syncope  Instructions for follow-up, activity and diet:	Avoid missing meals the whole day. Keep yourself well hydrated. Follow up with cardiology outpatient clinic in 1-2 weeks.  Secondary Diagnosis:	Acute cystitis without hematuria  Goal:	Resolution of infection  Instructions for follow-up, activity and diet:	Continue with Oral Ciprofloxacin 250mg twice daily over the next 5 days.  Secondary Diagnosis:	Gastroesophageal reflux disease, esophagitis presence not specified  Goal:	Resolution of symptoms  Instructions for follow-up, activity and diet:	Continue with home medications  Secondary Diagnosis:	Essential hypertension  Goal:	Blood pressure control  Instructions for follow-up, activity and diet:	Continue with home medications.

## 2017-10-11 NOTE — DISCHARGE NOTE ADULT - SECONDARY DIAGNOSIS.
Acute cystitis without hematuria Gastroesophageal reflux disease, esophagitis presence not specified Essential hypertension

## 2017-10-11 NOTE — PROGRESS NOTE ADULT - SUBJECTIVE AND OBJECTIVE BOX
Patient is a 76y old  Female who presents with a chief complaint of Syncopal episode. (10 Oct 2017 01:30)  HPI:  77 y/o female with pmh HTN, HL, GERD, recent dx of invasive breast CA, s/p rt lumpectomy yesterday. Pt had been fasting for the procedure and upon discharge, felt lightheaded and a little unstable gait.  At home, she felt thirsty and had some food; also c/p rt breast pain at the incision site. As per family, she subsequently became very profusely diaphoretic, slumped backwards and became responsive with slurred speech. Pt was confused for ~ 10 min.  When pt awoke, vomited x 1. When seen by ED physician, c/o SS burning pain similar to her GERD after she vomited and denied dizziness. Pt treated with antacid and H2 blocker and symptoms resolved. Pt has history of previous syncopal episodes, per son, this was never worked up. No FH syncope. No fever/chills. No photophobia/eye pain/changes in vision, No ear pain/sore throat/dysphagia, No current chest pain/palpitations. No SOB/cough/stridor. No abdominal pain, N/V/D, no black/bloody bm. No dysuria/frequency/discharge, No headache. + Dizziness.  No rash.  No numbness/tingling/weakness.  Overnight, sx resolved.  Not orthostatic and has been able to walk without further symptoms    EKG unremarkable.  CE neg.    PAST MEDICAL & SURGICAL HISTORY:  Syncope, unspecified syncope type  Essential hypertension  Gastroesophageal reflux disease, esophagitis presence not specified  No significant past surgical history    Home Medications:  amLODIPine 10 mg oral tablet: 1 tab(s) orally once a day (09 Oct 2017 19:45)  ibuprofen 400 mg oral tablet: 1 tab(s) orally every 6 hours (09 Oct 2017 19:45)  ondansetron 4 mg oral tablet, disintegrating: orally every 4 hours (09 Oct 2017 19:45)  Percocet 5/325 oral tablet: orally every 4 hours (09 Oct 2017 19:45)  PriLOSEC 10 mg oral delayed release capsule: 1 cap(s) orally once a day (09 Oct 2017 19:45)  simvastatin 10 mg oral tablet: 1 tab(s) orally once a day (at bedtime) (09 Oct 2017 19:45)      INTERVAL HISTORY: No cardiac issues overnight. Denies dizziness. Ambulates without difficulty.   	  MEDICATIONS:  MEDICATIONS  (STANDING):  amLODIPine   Tablet 2.5 milliGRAM(s) Oral daily  aspirin enteric coated 81 milliGRAM(s) Oral daily  ciprofloxacin     Tablet 250 milliGRAM(s) Oral two times a day  heparin  Injectable 5000 Unit(s) SubCutaneous every 12 hours  pantoprazole  Injectable 40 milliGRAM(s) IV Push daily  simvastatin 20 milliGRAM(s) Oral at bedtime    MEDICATIONS  (PRN):  acetaminophen   Tablet. 650 milliGRAM(s) Oral every 6 hours PRN Mild Pain (1 - 3)  ondansetron Injectable 4 milliGRAM(s) IV Push every 6 hours PRN Nausea and/or Vomiting      Vitals:  T(F): 98.8 (10-11-17 @ 05:06), Max: 98.8 (10-10-17 @ 17:09)  HR: 62 (10-11-17 @ 07:04) (62 - 80)  BP: 123/58 (10-11-17 @ 05:06) (120/66 - 149/59)  RR: 18 (10-11-17 @ 05:06) (18 - 18)  SpO2: 99% (10-11-17 @ 05:06) (97% - 99%)  Wt(kg): --    10-10 @ 07:01  -  10-11 @ 07:00  --------------------------------------------------------  IN:    Oral Fluid: 600 mL  Total IN: 600 mL    OUT:  Total OUT: 0 mL    Total NET: 600 mL    PHYSICAL EXAM:  Neuro: Awake, responsive, sitting on bedside watching TV.  CV: S1 S2 RRR  Lungs: CTABL  GI: Soft, BS +, ND, NT  Extremities: No edema    TELEMETRY:  NSR overnight  	   	  RADIOLOGY:   < from: Xray Chest 1 View AP/PA. (10.09.17 @ 19:55) >  EXAM:  CHEST SINGLE VIEW                            PROCEDURE DATE:  10/09/2017          INTERPRETATION:  PROCEDURE: AP view of the chest.    CLINICAL INFORMATION: Vomiting    There is no prior radiograph available for comparison at the time of this   report.    FINDINGS:        There are no focal air space opacities. The cardiac and mediastinal   contours are prominent, which may be due to magnification from AP   technique and shallow inspiration. The osseous structures are intact.    IMPRESSION:    No focal air space opacities.    < end of copied text >      [ ] Echocardiogram:  < from: TTE Echo Doppler w/o Cont (10.10.17 @ 15:45) >  PHYSICIAN INTERPRETATION:  Left Ventricle: Normal left ventricular size andwall thicknesses, with   normal systolic and diastolic function.  Left ventricular ejection fraction, by visual estimation, is 65 to 70%.   Spectral Doppler shows impaired relaxation pattern of left ventricular   myocardial filling (Grade I diastolicdysfunction).  Right Ventricle: Normal right ventricular size and function.  Left Atrium: The left atrium is normal in size. Normal left atrial size.  Right Atrium: The right atrium is normal in size.  Pericardium: There is no evidence of pericardialeffusion.  Mitral Valve: Thickening of the anterior and posterior mitral valve   leaflets. Mild mitral valve regurgitation is seen.  Tricuspid Valve: Structurally normal tricuspid valve, with normal leaflet   excursion. The tricuspid valve is normal in structure. Mild tricuspid   regurgitation is visualized.  Aortic Valve: Normal trileaflet aortic valve with normal opening. Peak   transaortic gradient equals 7.5 mmHg, mean transaortic gradient equals   4.3 mmHg, the calculated aortic valve area equals 2.57 cm² by the   continuity equation consistent with normally opening aortic valve.  Pulmonic Valve: Structurally normal pulmonic valve, with normal leaflet   excursion. Mild pulmonic valve regurgitation.  Aorta: The aortic root is normal in size and structure.  Pulmonary Artery: The main pulmonary artery is normal in size.        Summary:   1. Left ventricular ejection fraction, by visual estimation, is 65 to   70%.   2. Spectral Doppler shows impaired relaxation pattern of left   ventricular myocardial filling (Grade I diastolic dysfunction).   3. Thickening of the anterior and posterior mitral valve leaflets.     [id number] Macario Mendez MD  Electronically signed on 10/10/2017 at 4:19:18 PM       < end of copied text >  	    LABS:	 	    CARDIAC MARKERS:  Troponin I, Serum: <.015 ng/mL (10-10 @ 08:22)  Troponin I, Serum: <.015 ng/mL (10-09 @ 20:16)                     12.8   13.6  )-----------( 209      ( 10 Oct 2017 08:22 )             39.2 ,                       12.6   16.7  )-----------( 228      ( 09 Oct 2017 20:16 )             37.6   10-10    143  |  111<H>  |  13  ----------------------------<  87  4.1   |  23  |  0.85    Ca    8.0<L>      10 Oct 2017 08:22    TPro  6.8  /  Alb  3.3  /  TBili  0.5  /  DBili  x   /  AST  26  /  ALT  26  /  AlkPhos  85  10-09    Lipid Profile: 10-10 @ 10:08  HDL/Total Cholesterol: 3.2 RATIO  HDL Chol:              57 mg/dL  Serum Chol:            183 mg/dL  Direct LDL:            97 mg/dL  Triglycerides:         145 mg/dL    HgA1c: Hemoglobin A1C, Whole Blood: 5.5 % (10-10 @ 10:07)

## 2017-10-11 NOTE — DISCHARGE NOTE ADULT - PATIENT PORTAL LINK FT
“You can access the [facility] Patient Portal, offered by [facility], by registering with the following website: [email or web address]”

## 2017-10-11 NOTE — DISCHARGE NOTE ADULT - CARE PROVIDER_API CALL
Elijah Burr), Cardiology; Internal Medicine; Interventional Cardiology  [address] [facility]  [address] [place] [address]  Phone: [phone]  Fax: [phone]

## 2017-10-11 NOTE — PROGRESS NOTE ADULT - ASSESSMENT
75yo lady admitted with likely vasovagal syncope.  Pt was NPO all day for surgery, and subsequently complained of incisional pain prior to syncope.  EKG completely normal.  CE neg.  Completely asymptomatic currently after IVFs/PO/pain control.    - tele unremarkable for arrhythmias   -2D echo essentially unremarkable.   ok to d/c home for further outpt ischemic work-up  -cont current home for HTN  -cont statin for HL; tolerating it well

## 2017-10-11 NOTE — DISCHARGE NOTE ADULT - PLAN OF CARE
No dizziness or syncope Avoid missing meals the whole day. Keep yourself well hydrated. Follow up with cardiology outpatient clinic in 1-2 weeks. Resolution of infection Continue with Oral Ciprofloxacin 250mg twice daily over the next 5 days. Resolution of symptoms Continue with home medications Blood pressure control Continue with home medications.

## 2017-10-13 DIAGNOSIS — K21.9 GASTRO-ESOPHAGEAL REFLUX DISEASE WITHOUT ESOPHAGITIS: ICD-10-CM

## 2017-10-13 DIAGNOSIS — Z28.21 IMMUNIZATION NOT CARRIED OUT BECAUSE OF PATIENT REFUSAL: ICD-10-CM

## 2017-10-13 DIAGNOSIS — C50.911 MALIGNANT NEOPLASM OF UNSPECIFIED SITE OF RIGHT FEMALE BREAST: ICD-10-CM

## 2017-10-13 DIAGNOSIS — R55 SYNCOPE AND COLLAPSE: ICD-10-CM

## 2017-10-13 DIAGNOSIS — N30.00 ACUTE CYSTITIS WITHOUT HEMATURIA: ICD-10-CM

## 2017-10-13 DIAGNOSIS — E78.5 HYPERLIPIDEMIA, UNSPECIFIED: ICD-10-CM

## 2017-10-13 DIAGNOSIS — Z98.890 OTHER SPECIFIED POSTPROCEDURAL STATES: ICD-10-CM

## 2017-10-13 DIAGNOSIS — I10 ESSENTIAL (PRIMARY) HYPERTENSION: ICD-10-CM

## 2018-06-01 PROBLEM — K21.9 GASTRO-ESOPHAGEAL REFLUX DISEASE WITHOUT ESOPHAGITIS: Chronic | Status: ACTIVE | Noted: 2017-10-10

## 2018-06-01 PROBLEM — I10 ESSENTIAL (PRIMARY) HYPERTENSION: Chronic | Status: ACTIVE | Noted: 2017-10-10

## 2018-06-01 PROBLEM — Z00.00 ENCOUNTER FOR PREVENTIVE HEALTH EXAMINATION: Status: ACTIVE | Noted: 2018-06-01

## 2018-06-01 PROBLEM — R55 SYNCOPE AND COLLAPSE: Chronic | Status: ACTIVE | Noted: 2017-10-10

## 2018-06-04 ENCOUNTER — APPOINTMENT (OUTPATIENT)
Dept: RADIATION ONCOLOGY | Facility: CLINIC | Age: 78
End: 2018-06-04
Payer: MEDICARE

## 2018-06-04 ENCOUNTER — OUTPATIENT (OUTPATIENT)
Dept: OUTPATIENT SERVICES | Facility: HOSPITAL | Age: 78
LOS: 1 days | Discharge: HOME | End: 2018-06-04
Payer: MEDICARE

## 2018-06-04 VITALS
DIASTOLIC BLOOD PRESSURE: 79 MMHG | WEIGHT: 123.68 LBS | BODY MASS INDEX: 23.35 KG/M2 | HEART RATE: 88 BPM | RESPIRATION RATE: 16 BRPM | OXYGEN SATURATION: 99 % | HEIGHT: 61 IN | SYSTOLIC BLOOD PRESSURE: 156 MMHG

## 2018-06-04 DIAGNOSIS — E78.5 HYPERLIPIDEMIA, UNSPECIFIED: ICD-10-CM

## 2018-06-04 DIAGNOSIS — I10 ESSENTIAL (PRIMARY) HYPERTENSION: ICD-10-CM

## 2018-06-04 DIAGNOSIS — Z78.9 OTHER SPECIFIED HEALTH STATUS: ICD-10-CM

## 2018-06-04 PROCEDURE — 99205 OFFICE O/P NEW HI 60 MIN: CPT | Mod: 25

## 2018-06-04 RX ORDER — SIMVASTATIN 10 MG/1
10 TABLET, FILM COATED ORAL
Refills: 0 | Status: ACTIVE | COMMUNITY

## 2018-06-04 RX ORDER — AMLODIPINE BESYLATE 10 MG/1
10 TABLET ORAL
Refills: 0 | Status: ACTIVE | COMMUNITY

## 2018-06-04 RX ORDER — ANASTROZOLE TABLETS 1 MG/1
1 TABLET ORAL
Refills: 0 | Status: ACTIVE | COMMUNITY

## 2018-06-06 PROBLEM — Z78.9 NO FAMILY HISTORY OF CANCER: Status: ACTIVE | Noted: 2018-06-04

## 2018-06-21 ENCOUNTER — RESULT REVIEW (OUTPATIENT)
Age: 78
End: 2018-06-21

## 2018-06-21 PROCEDURE — 88321 CONSLTJ&REPRT SLD PREP ELSWR: CPT

## 2018-12-03 ENCOUNTER — APPOINTMENT (OUTPATIENT)
Dept: RADIATION ONCOLOGY | Facility: CLINIC | Age: 78
End: 2018-12-03
Payer: MEDICARE

## 2018-12-19 ENCOUNTER — APPOINTMENT (OUTPATIENT)
Dept: RADIATION ONCOLOGY | Facility: CLINIC | Age: 78
End: 2018-12-19
Payer: MEDICARE

## 2018-12-19 VITALS
SYSTOLIC BLOOD PRESSURE: 141 MMHG | RESPIRATION RATE: 15 BRPM | OXYGEN SATURATION: 99 % | WEIGHT: 127.43 LBS | BODY MASS INDEX: 24.08 KG/M2 | HEART RATE: 79 BPM | DIASTOLIC BLOOD PRESSURE: 76 MMHG | TEMPERATURE: 98.24 F

## 2018-12-19 DIAGNOSIS — C50.919 MALIGNANT NEOPLASM OF UNSPECIFIED SITE OF UNSPECIFIED FEMALE BREAST: ICD-10-CM

## 2018-12-19 PROCEDURE — 99213 OFFICE O/P EST LOW 20 MIN: CPT | Mod: GC,25

## 2018-12-19 NOTE — REVIEW OF SYSTEMS
[Alopecia: Grade 0] : Alopecia: Grade 0 [Pruritus: Grade 0] : Pruritus: Grade 0 [Skin Atrophy: Grade 0] : Skin Atrophy: Grade 0 [Skin Hyperpigmentation: Grade 0] : Skin Hyperpigmentation: Grade 0 [Skin Induration: Grade 0] : Skin Induration: Grade 0 [Dermatitis Radiation: Grade 0] : Dermatitis Radiation: Grade 0 [Breast Pain: Grade 0] : Breast Pain: Grade 0 [Negative] : Allergic/Immunologic

## 2018-12-22 NOTE — PHYSICAL EXAM
[Breast Palpation Mass] : no palpable masses [Breast Abnormal Lactation (Galactorrhea)] : no nipple discharge [No UE Edema] : there is no upper extremity edema [Normal] : no joint swelling, no clubbing or cyanosis of the fingernails and muscle strength and tone were normal [] : no respiratory distress [Heart Rate And Rhythm] : heart rate and rhythm were normal [Abdomen Soft] : soft [Nondistended] : nondistended [Supraclavicular Lymph Nodes Enlarged Bilaterally] : supraclavicular [Axillary Lymph Nodes Enlarged Bilaterally] : axillary [de-identified] : well healed right breast surgical scar

## 2018-12-22 NOTE — HISTORY OF PRESENT ILLNESS
[FreeTextEntry1] : Ms. Miguel is an 78 year-old woman with stage IA [id number] well differentiated, hormone receptor strongly positive, invasive ductal carcinoma of the right breast. She underwent lumpectomy with negative margins for at least the invasive component and no lymph node sampling. She did not receive radiotherapy but started an AI. She returns for routine follow up. \par \par She has been taking Arimidex and is tolerating it well. She denies aches and pains. She underwent a mammogram on 6/19/18 dense and showed no evidence of malignancy. The breasts were extremely and there was post-surgical scarring at 12:00 on the right side. was negative. Ultrasound was also done showing hypoechoic nodules at 12:00, 1:00 and in the subareolar regions. Th subareolar nodules was new and the others were stable. A 6 month follow up was recommended. \par

## 2018-12-22 NOTE — REASON FOR VISIT
[Routine Follow-Up] : routine follow-up visit for [Breast Cancer] : breast cancer [Family Member] : family member [[employer] ] : provided by [employer]   [FreeTextEntry1] : [id number]

## 2020-02-27 NOTE — DISCHARGE NOTE ADULT - MODE OF TRANSPORTATION
s/p DC CT-improved loculations on CT but sill present, inflammation RUL present--will require f/up over next 4-6 wks Ambulatory

## 2021-03-30 NOTE — ED ADULT TRIAGE NOTE - BMI (KG/M2)
----- Message from Yolie Robertson MD sent at 3/30/2021  8:19 AM CDT -----  Could you let pt know that HCG is unfortunately very low so likely she is getting her period. Would recommend repeating HCG in 48 hours to confirm. Thanks!   22.7

## 2024-03-27 NOTE — ED ADULT TRIAGE NOTE - MODE OF ARRIVAL
Sending back to P[facility] pool as a reminder in case  does not call for an appointment. Attempts have to be made over multiple days, so only one attempt has been made to schedule. -ALYSSA   EMS